# Patient Record
Sex: FEMALE | Race: OTHER | Employment: OTHER | URBAN - METROPOLITAN AREA
[De-identification: names, ages, dates, MRNs, and addresses within clinical notes are randomized per-mention and may not be internally consistent; named-entity substitution may affect disease eponyms.]

---

## 2019-03-28 ENCOUNTER — HOSPITAL ENCOUNTER (EMERGENCY)
Age: 69
Discharge: HOME | End: 2019-03-28
Payer: MEDICARE

## 2019-03-28 VITALS
HEIGHT: 59 IN | WEIGHT: 259.26 LBS | HEART RATE: 78 BPM | BODY MASS INDEX: 52.27 KG/M2 | TEMPERATURE: 97.2 F | DIASTOLIC BLOOD PRESSURE: 77 MMHG | OXYGEN SATURATION: 100 % | RESPIRATION RATE: 16 BRPM | SYSTOLIC BLOOD PRESSURE: 155 MMHG

## 2019-03-28 DIAGNOSIS — M79.605 LEFT LEG PAIN: Primary | ICD-10-CM

## 2019-03-28 PROCEDURE — 99283 EMERGENCY DEPT VISIT LOW MDM: CPT

## 2019-03-28 PROCEDURE — 93971 EXTREMITY STUDY: CPT

## 2019-03-28 RX ORDER — NAPROXEN 500 MG/1
500 TABLET ORAL 2 TIMES DAILY PRN
Qty: 20 TABLET | Refills: 0 | Status: SHIPPED | OUTPATIENT
Start: 2019-03-28

## 2019-03-28 ASSESSMENT — PAIN DESCRIPTION - FREQUENCY
FREQUENCY: CONTINUOUS
FREQUENCY: CONTINUOUS

## 2019-03-28 ASSESSMENT — PAIN DESCRIPTION - ONSET
ONSET: ON-GOING
ONSET: ON-GOING

## 2019-03-28 ASSESSMENT — PAIN DESCRIPTION - ORIENTATION
ORIENTATION: LEFT
ORIENTATION: LEFT

## 2019-03-28 ASSESSMENT — PAIN SCALES - GENERAL
PAINLEVEL_OUTOF10: 5
PAINLEVEL_OUTOF10: 6

## 2019-03-28 ASSESSMENT — PAIN DESCRIPTION - PROGRESSION
CLINICAL_PROGRESSION: GRADUALLY IMPROVING
CLINICAL_PROGRESSION: GRADUALLY WORSENING

## 2019-03-28 ASSESSMENT — PAIN DESCRIPTION - PAIN TYPE
TYPE: ACUTE PAIN
TYPE: ACUTE PAIN

## 2019-03-28 ASSESSMENT — PAIN DESCRIPTION - DESCRIPTORS
DESCRIPTORS: SHARP
DESCRIPTORS: SHARP

## 2019-03-28 ASSESSMENT — PAIN DESCRIPTION - LOCATION
LOCATION: LEG
LOCATION: LEG

## 2019-04-22 ENCOUNTER — HOSPITAL ENCOUNTER (OUTPATIENT)
Dept: MAMMOGRAPHY | Age: 69
Discharge: HOME | End: 2019-04-27
Payer: MEDICARE

## 2019-04-22 DIAGNOSIS — Z12.31 VISIT FOR SCREENING MAMMOGRAM: ICD-10-CM

## 2019-04-22 PROCEDURE — 77067 SCR MAMMO BI INCL CAD: CPT

## 2019-06-17 ENCOUNTER — OFFICE VISIT (OUTPATIENT)
Dept: SURGERY | Age: 69
End: 2019-06-17
Payer: MEDICARE

## 2019-06-17 VITALS
BODY MASS INDEX: 53.02 KG/M2 | SYSTOLIC BLOOD PRESSURE: 140 MMHG | TEMPERATURE: 97.2 F | DIASTOLIC BLOOD PRESSURE: 92 MMHG | WEIGHT: 263 LBS | HEIGHT: 59 IN

## 2019-06-17 DIAGNOSIS — R10.30 LOWER ABDOMINAL PAIN: Primary | ICD-10-CM

## 2019-06-17 PROCEDURE — G8400 PT W/DXA NO RESULTS DOC: HCPCS | Performed by: SURGERY

## 2019-06-17 PROCEDURE — 1036F TOBACCO NON-USER: CPT | Performed by: SURGERY

## 2019-06-17 PROCEDURE — G8427 DOCREV CUR MEDS BY ELIG CLIN: HCPCS | Performed by: SURGERY

## 2019-06-17 PROCEDURE — 1123F ACP DISCUSS/DSCN MKR DOCD: CPT | Performed by: SURGERY

## 2019-06-17 PROCEDURE — 99203 OFFICE O/P NEW LOW 30 MIN: CPT | Performed by: SURGERY

## 2019-06-17 PROCEDURE — 3017F COLORECTAL CA SCREEN DOC REV: CPT | Performed by: SURGERY

## 2019-06-17 PROCEDURE — G8417 CALC BMI ABV UP PARAM F/U: HCPCS | Performed by: SURGERY

## 2019-06-17 PROCEDURE — 1090F PRES/ABSN URINE INCON ASSESS: CPT | Performed by: SURGERY

## 2019-06-17 PROCEDURE — 4040F PNEUMOC VAC/ADMIN/RCVD: CPT | Performed by: SURGERY

## 2019-06-17 RX ORDER — OLMESARTAN MEDOXOMIL 40 MG/1
20 TABLET ORAL DAILY
COMMUNITY

## 2019-06-17 RX ORDER — CARVEDILOL 6.25 MG/1
25 TABLET ORAL 2 TIMES DAILY WITH MEALS
COMMUNITY

## 2019-06-17 RX ORDER — METFORMIN HYDROCHLORIDE 750 MG/1
750 TABLET, EXTENDED RELEASE ORAL
COMMUNITY

## 2019-06-17 RX ORDER — ACETAMINOPHEN 160 MG
TABLET,DISINTEGRATING ORAL
COMMUNITY

## 2019-06-17 NOTE — LETTER
[employer] [facility] [address]  [address] [place] [address] Road  Phone: [phone]  Fax: [phone]    Jay Isbell MD        June 20, 2019     Josy Del Rio, [address] [place] [id number]    Patient: Bakari Schwarz  MR Number: [id number]  YOB: 1950  Date of Visit: 6/17/2019    Dear Dr. Josy Del Rio:    Thank you for the request for consultation for Bakari Schwarz to me for the evaluation of an incisional hernia. Below are the relevant portions of my assessment and plan of care. Patient with reducible hernia infraumbilical incision secondary to previous appendectomy in 2017. Plan for CT of abdomen to better evaluate hernia and fascial defect. Patient is in agreement with this plan. We also discussed the importance of weight loss to minimize her risk of recurrence. I will see her back in the office after this study. If you have questions, please do not hesitate to call me. I look forward to following Cici Yi along with you.     Sincerely,        Jay Isbell MD

## 2019-06-17 NOTE — PROGRESS NOTES
Drug use: Not on file    Sexual activity: Not on file   Lifestyle    Physical activity:     Days per week: Not on file     Minutes per session: Not on file    Stress: Not on file   Relationships    Social connections:     Talks on phone: Not on file     Gets together: Not on file     Attends Synagogue service: Not on file     Active member of club or organization: Not on file     Attends meetings of clubs or organizations: Not on file     Relationship status: Not on file    Intimate partner violence:     Fear of current or ex partner: Not on file     Emotionally abused: Not on file     Physically abused: Not on file     Forced sexual activity: Not on file   Other Topics Concern    Not on file   Social History Narrative    Not on file       Allergy:   Allergies   Allergen Reactions    Statins Itching and Rash    Penicillins Swelling and Rash       PHYSICAL EXAM:  VITALS:  BP (!) 140/92 (Site: Left Lower Arm, Position: Sitting, Cuff Size: Medium Adult)   Temp 97.2 °F (36.2 °C) (Oral)   Ht 4' 11\" (1.499 m)   Wt 263 lb (119.3 kg)   BMI 53.12 kg/m²     CONSTITUTIONAL:  alert, no apparent distress and morbidly obese  EYES:  sclera clear  ENT:  normocepalic, without obvious abnormality  NECK:  supple, symmetrical, trachea midline and no carotid bruits  LUNGS:  clear to auscultation  CARDIOVASCULAR:  regular rate and rhythm and no murmur noted  ABDOMEN:  Infraumbilical scar with reducible hernia, no overlying skin color changes, trocar site of left abdomen with mind indentation without hernia noted, normal bowel sounds, soft, non-distended, non-tender, voluntary guarding absent, no masses palpated   MUSCULOSKELETAL:  0+ pitting edema lower extremities  NEUROLOGIC:  Mental Status Exam:  Level of Alertness:   awake  Orientation:   person, place, time  SKIN:  no bruising or bleeding    IMPRESSION/RECOMMENDATIONS:    Patient with reducible hernia infraumbilical incision secondary to previous appendectomy in 2017. Plan for CT of abdomen to better evaluate hernia and fascial defect. Patient is in agreement with this plan. We also discussed the importance of weight loss to minimize her risk of recurrence. Jose David Jameson     I have seen, examined, and reviewed the patients chart. I agree with the residents assessment and have made appropriate changes.     Nickie Thakkar

## 2019-06-24 ENCOUNTER — HOSPITAL ENCOUNTER (OUTPATIENT)
Dept: CT IMAGING | Age: 69
Discharge: HOME | End: 2019-06-24
Payer: MEDICARE

## 2019-06-24 DIAGNOSIS — R10.30 LOWER ABDOMINAL PAIN: ICD-10-CM

## 2019-06-24 PROCEDURE — 74176 CT ABD & PELVIS W/O CONTRAST: CPT

## 2019-06-27 ENCOUNTER — TELEPHONE (OUTPATIENT)
Dept: SURGERY | Age: 69
End: 2019-06-27

## 2019-06-27 NOTE — TELEPHONE ENCOUNTER
JONATHAN  for a call back to schedule follow up visit with  to discuss 6/24/19 CT results and next step.

## 2019-07-01 ENCOUNTER — OFFICE VISIT (OUTPATIENT)
Dept: SURGERY | Age: 69
End: 2019-07-01
Payer: MEDICARE

## 2019-07-01 VITALS
DIASTOLIC BLOOD PRESSURE: 82 MMHG | WEIGHT: 264 LBS | BODY MASS INDEX: 53.22 KG/M2 | SYSTOLIC BLOOD PRESSURE: 180 MMHG | TEMPERATURE: 97.2 F | HEIGHT: 59 IN

## 2019-07-01 DIAGNOSIS — K43.9 VENTRAL HERNIA WITHOUT OBSTRUCTION OR GANGRENE: Primary | ICD-10-CM

## 2019-07-01 PROCEDURE — 1123F ACP DISCUSS/DSCN MKR DOCD: CPT | Performed by: SURGERY

## 2019-07-01 PROCEDURE — 4040F PNEUMOC VAC/ADMIN/RCVD: CPT | Performed by: SURGERY

## 2019-07-01 PROCEDURE — G8400 PT W/DXA NO RESULTS DOC: HCPCS | Performed by: SURGERY

## 2019-07-01 PROCEDURE — 1036F TOBACCO NON-USER: CPT | Performed by: SURGERY

## 2019-07-01 PROCEDURE — 1090F PRES/ABSN URINE INCON ASSESS: CPT | Performed by: SURGERY

## 2019-07-01 PROCEDURE — G8417 CALC BMI ABV UP PARAM F/U: HCPCS | Performed by: SURGERY

## 2019-07-01 PROCEDURE — G8427 DOCREV CUR MEDS BY ELIG CLIN: HCPCS | Performed by: SURGERY

## 2019-07-01 PROCEDURE — 99213 OFFICE O/P EST LOW 20 MIN: CPT | Performed by: SURGERY

## 2019-07-01 PROCEDURE — 3017F COLORECTAL CA SCREEN DOC REV: CPT | Performed by: SURGERY

## 2020-10-19 ENCOUNTER — HOSPITAL ENCOUNTER (OUTPATIENT)
Dept: MAMMOGRAPHY | Age: 70
Discharge: HOME | End: 2020-10-24
Payer: MEDICARE

## 2020-10-19 PROCEDURE — 77067 SCR MAMMO BI INCL CAD: CPT

## 2020-11-02 ENCOUNTER — APPOINTMENT (OUTPATIENT)
Dept: GENERAL RADIOLOGY | Age: 70
End: 2020-11-02
Payer: MEDICARE

## 2020-11-02 ENCOUNTER — HOSPITAL ENCOUNTER (EMERGENCY)
Age: 70
Discharge: HOME | End: 2020-11-02
Payer: MEDICARE

## 2020-11-02 VITALS
TEMPERATURE: 98.6 F | WEIGHT: 270 LBS | DIASTOLIC BLOOD PRESSURE: 87 MMHG | BODY MASS INDEX: 54.43 KG/M2 | RESPIRATION RATE: 24 BRPM | OXYGEN SATURATION: 95 % | HEIGHT: 59 IN | HEART RATE: 71 BPM | SYSTOLIC BLOOD PRESSURE: 203 MMHG

## 2020-11-02 PROCEDURE — 99283 EMERGENCY DEPT VISIT LOW MDM: CPT

## 2020-11-02 PROCEDURE — 73560 X-RAY EXAM OF KNEE 1 OR 2: CPT

## 2020-11-02 PROCEDURE — 6370000000 HC RX 637 (ALT 250 FOR IP): Performed by: PHYSICIAN ASSISTANT

## 2020-11-02 RX ORDER — LIDOCAINE 50 MG/G
1 PATCH TOPICAL DAILY
Qty: 30 PATCH | Refills: 0 | Status: SHIPPED | OUTPATIENT
Start: 2020-11-02

## 2020-11-02 RX ADMIN — APIXABAN 10 MG: 5 TABLET, FILM COATED ORAL at 19:27

## 2020-11-02 ASSESSMENT — PAIN SCALES - GENERAL: PAINLEVEL_OUTOF10: 6

## 2020-11-02 ASSESSMENT — PAIN DESCRIPTION - PAIN TYPE: TYPE: ACUTE PAIN

## 2020-11-02 NOTE — ED PROVIDER NOTES
[facility]        Pt Name: Pelon Cardenas  MRN: [id number]  [place] 1950  Date of evaluation: 2020  Provider: Geo Lind PA-C  PCP: Jess Omalley MD    ROPOA. I have evaluated this patient. My supervising physician was available for consultation. CHIEF COMPLAINT       Chief Complaint   Patient presents with    Knee Pain     Pain behind left knee for 2 days. hx of DVT, denies injury       HISTORY OF PRESENT ILLNESS   (Location, Timing/Onset, Context/Setting, Quality, Duration, Modifying Factors, Severity, Associated Signs and Symptoms)  Note limiting factors. Pelon Cardenas is a 78 y.o. female that presents to the emergency department with a chief complaint of some left knee pain behind her knee for the past 2 days without injury or trauma. She states 3 years ago she had a DVT after an appendectomy and is no longer on blood thinners. She only takes aspirin at this time. She denies chest pain, shortness of breath, nausea, vomiting, fevers, numbness, color change. She rates the pain a 6 out of 10. She still been able to ambulate with her cane. Denies any other symptoms. Nursing Notes were all reviewed and agreed with or any disagreements were addressed in the HPI. REVIEW OF SYSTEMS    (2-9 systems for level 4, 10 or more for level 5)     Review of Systems    Positives and Pertinent negatives as per HPI. Except as noted above in the ROS, all other systems were reviewed and negative.        PAST MEDICAL HISTORY     Past Medical History:   Diagnosis Date    Hypertension     Type 2 diabetes mellitus without complication ([facility] [address]          SURGICAL HISTORY     Past Surgical History:   Procedure Laterality Date    APPENDECTOMY  2017     SECTION      [id number]    GASTRIC BYPASS SURGERY  2009    HYSTERECTOMY, TOTAL ABDOMINAL  1992         CURRENTMEDICATIONS       Previous Medications    ASPIRIN 81 MG TABLET    Take 81 mg by mouth daily    CARVEDILOL (COREG) 6.25 MG TABLET    Take 25 mg by mouth 2 times daily (with meals)     CHOLECALCIFEROL (VITAMIN D3) 2000 UNITS CAPS    Take by mouth    METFORMIN (GLUCOPHAGE-XR) 750 MG EXTENDED RELEASE TABLET    Take 750 mg by mouth daily (with breakfast)    NAPROXEN (NAPROSYN) 500 MG TABLET    Take 1 tablet by mouth 2 times daily as needed for Pain    OLMESARTAN (BENICAR) 40 MG TABLET    Take 20 mg by mouth daily          ALLERGIES     Statins and Penicillins    FAMILYHISTORY     History reviewed. No pertinent family history. SOCIAL HISTORY       Social History     Tobacco Use    Smoking status: Never Smoker    Smokeless tobacco: Never Used   Substance Use Topics    Alcohol use: Not Currently    Drug use: Not Currently       SCREENINGS             PHYSICAL EXAM    (up to 7 for level 4, 8 or more for level 5)     ED Triage Vitals [11/02/20 1556]   BP Temp Temp src Pulse Resp SpO2 Height Weight   (!) 230/97 98.6 °F (37 °C) -- 71 24 95 % 4' 11\" (1.499 m) 270 lb (122.5 kg)       Physical Exam  Vitals signs and nursing note reviewed. Constitutional:       Appearance: She is well-developed. She is not diaphoretic. HENT:      Head: Atraumatic. Nose: Nose normal.   Eyes:      General:         Right eye: No discharge. Left eye: No discharge. Neck:      Musculoskeletal: Normal range of motion. Cardiovascular:      Rate and Rhythm: Normal rate and regular rhythm. Pulses: Normal pulses. Heart sounds: Normal heart sounds. No murmur. No gallop. Comments: 2+ posterior tibial pulse in the left foot  Pulmonary:      Effort: Pulmonary effort is normal.      Breath sounds: Normal breath sounds. No stridor. No wheezing, rhonchi or rales. Musculoskeletal:         General: Tenderness present. No deformity. Comments: Generalized tenderness around the left knee without joint warmth, erythema, rash or deformity.   Full range of motion of left hip and left ankle. Mild decreased range of motion of the left knee secondary to pain. Patient witnessed walking easily with her cane. Skin:     General: Skin is warm and dry. Findings: No erythema or rash. Neurological:      Mental Status: She is alert and oriented to person, place, and time. Cranial Nerves: No cranial nerve deficit. Psychiatric:         Behavior: Behavior normal.         DIAGNOSTIC RESULTS   LABS:    Labs Reviewed - No data to display    All other labs were within normal range or not returned as of this dictation. EKG: All EKG's are interpreted by the Emergency Department Physician in the absence of a cardiologist.  Please see their note for interpretation of EKG. RADIOLOGY:   Non-plain film images such as CT, Ultrasound and MRI are read by the radiologist. Plain radiographic images are visualized and preliminarily interpreted by the ED Provider with the below findings:        Interpretation per the Radiologist below, if available at the time of this note:    XR KNEE LEFT (1-2 VIEWS)   Final Result   No acute osseous abnormality of the left knee. Severe tricompartment   osteoarthritic changes. VL Extremity Venous Left    (Results Pending)     Xr Knee Left (1-2 Views)    Result Date: 11/2/2020  EXAMINATION: 2 XRAY VIEWS OF THE LEFT KNEE 11/2/2020 6:08 pm COMPARISON: None. HISTORY: ORDERING SYSTEM PROVIDED HISTORY: pain TECHNOLOGIST PROVIDED HISTORY: Reason for exam:->pain Reason for Exam: left knee pain Acuity: Acute Type of Exam: Initial FINDINGS: No acute fracture or dislocation. Severe tricompartment osteoarthritic changes of the knee with moderately severe narrowing of the medial compartment of the tibiofemoral joint and prominent tricompartment osteoarthritic spurring. Minimal suprapatellar joint effusion. No acute osseous abnormality of the left knee. Severe tricompartment osteoarthritic changes.            PROCEDURES   Unless otherwise noted below, none Procedures    CRITICAL CARE TIME   N/A    CONSULTS:  None      EMERGENCY DEPARTMENT COURSE and DIFFERENTIAL DIAGNOSIS/MDM:   Vitals:    Vitals:    11/02/20 1556   BP: (!) 230/97   Pulse: 71   Resp: 24   Temp: 98.6 °F (37 °C)   SpO2: 95%   Weight: 270 lb (122.5 kg)   Height: 4' 11\" (1.499 m)       Patient was given the following medications:  Medications   apixaban (ELIQUIS) tablet 10 mg (has no administration in time range)     Followed by   apixaban (ELIQUIS) tablet 10 mg (has no administration in time range)           That presents to the emergency department with a chief complaint of some nontraumatic left knee pain. Has history of DVT. She has been ambulating here with her walker normally. X-ray imaging reveals severe tricompartmental osteoarthritic changes. The pain could just be from osteoarthritis but patient also has history of DVT. Unable to get ultrasound as the patient came after 3 PM.  She will be discharged with Eliquis starter pack along with a stat ultrasound of her left leg. Low suspicion for acute fracture, arterial occlusion, septic arthritis, cellulitis or acute bony normality. Her blood pressure is elevated here but she is asymptomatic from this. She takes blood pressure medicine at home. She can follow-up with her family physician. Also referred to orthopedics due to the osteoarthritic changes. Return here for any worsening of symptoms or problems at home. FINAL IMPRESSION      1. Acute pain of left knee    2. History of DVT (deep vein thrombosis)    3.  Osteoarthritis of left knee, unspecified osteoarthritis type          DISPOSITION/PLAN   DISPOSITION Decision To Discharge 11/02/2020 06:39:46 PM      PATIENT REFERREDTO:  Paty Vidal MD  [address] [place] [address], [address]  [facility]  [phone]    Schedule an appointment as soon as possible for a visit   5-7 days    Liya Horvath MD  [address] [address]  [phone]    Schedule an appointment as soon as possible for a visit in 3 days  For re-check    [facility] Emergency Department  [address] [facility]  [phone]    As needed      DISCHARGE MEDICATIONS:  New Prescriptions    LIDOCAINE (LIDODERM) 5 %    Place 1 patch onto the skin daily 12 hours on, 12 hours off.        DISCONTINUED MEDICATIONS:  Discontinued Medications    No medications on file              (Please note that portions of this note were completed with a voice recognition program.  Efforts were made to edit the dictations but occasionally words are mis-transcribed.)    Jazz Cummins PA-C (electronically signed)           Jazz Cummins PA-C  11/02/20 1939

## 2020-11-03 NOTE — ED NOTES
Reviewed written discharge instructions with patient, instructed patient to call [address] in the morning to schedule a vascular doppler. Patient provided the eliquis dvt starter pack and instructions provided to patient. Patient denied questions and verbalized understanding. Patient ambulated out of ED using her cane without difficulty.      Paulina Sahu RN  11/02/20 6400

## 2020-11-03 NOTE — ED NOTES
Discussed patient's blood pressure with Malgorzata Altamirano. OK to discharge patient. Patient stated she takes her blood pressure medicine daily at 0600 and that she has an appointment to see her pcp on Wednesday.      Matteo Nazario RN  11/02/20 1913

## 2020-11-04 ENCOUNTER — TELEPHONE (OUTPATIENT)
Dept: PHARMACY | Age: 70
End: 2020-11-04

## 2020-11-04 ENCOUNTER — HOSPITAL ENCOUNTER (OUTPATIENT)
Dept: VASCULAR LAB | Age: 70
Discharge: HOME | End: 2020-11-04
Payer: MEDICARE

## 2020-11-04 PROCEDURE — 93971 EXTREMITY STUDY: CPT

## 2020-11-04 NOTE — TELEPHONE ENCOUNTER
Samira Hwang called from vascular. Patient was in the ER on 11/2 and was given Eliquis, vascular results are negative for DVT. Spoke with patient and took back medication that was given to them. Disposed of at [address]. Pt has PCP appt today. Any questions or concerns return call to clinic     [facility]. [facility]  [phone]

## 2020-11-04 NOTE — TELEPHONE ENCOUNTER
Abdulkadir Madera, from vascular lab, called to notify us he was sending patient up for a pharmacy consult. States patient was (-)negative for DVT in left leg.

## 2021-08-09 ENCOUNTER — OFFICE VISIT (OUTPATIENT)
Dept: ORTHOPEDIC SURGERY | Age: 71
End: 2021-08-09
Payer: MEDICARE

## 2021-08-09 VITALS — WEIGHT: 274 LBS | HEIGHT: 59 IN | BODY MASS INDEX: 55.24 KG/M2

## 2021-08-09 DIAGNOSIS — E66.01 CLASS 3 SEVERE OBESITY DUE TO EXCESS CALORIES WITH BODY MASS INDEX (BMI) OF 50.0 TO 59.9 IN ADULT, UNSPECIFIED WHETHER SERIOUS COMORBIDITY PRESENT (HCC): ICD-10-CM

## 2021-08-09 DIAGNOSIS — M17.0 ARTHRITIS OF BOTH KNEES: Primary | ICD-10-CM

## 2021-08-09 PROCEDURE — G8417 CALC BMI ABV UP PARAM F/U: HCPCS | Performed by: ORTHOPAEDIC SURGERY

## 2021-08-09 PROCEDURE — 4040F PNEUMOC VAC/ADMIN/RCVD: CPT | Performed by: ORTHOPAEDIC SURGERY

## 2021-08-09 PROCEDURE — 99203 OFFICE O/P NEW LOW 30 MIN: CPT | Performed by: ORTHOPAEDIC SURGERY

## 2021-08-09 PROCEDURE — 1123F ACP DISCUSS/DSCN MKR DOCD: CPT | Performed by: ORTHOPAEDIC SURGERY

## 2021-08-09 PROCEDURE — G8400 PT W/DXA NO RESULTS DOC: HCPCS | Performed by: ORTHOPAEDIC SURGERY

## 2021-08-09 PROCEDURE — 1090F PRES/ABSN URINE INCON ASSESS: CPT | Performed by: ORTHOPAEDIC SURGERY

## 2021-08-09 PROCEDURE — G8427 DOCREV CUR MEDS BY ELIG CLIN: HCPCS | Performed by: ORTHOPAEDIC SURGERY

## 2021-08-09 PROCEDURE — 1036F TOBACCO NON-USER: CPT | Performed by: ORTHOPAEDIC SURGERY

## 2021-08-09 PROCEDURE — 3017F COLORECTAL CA SCREEN DOC REV: CPT | Performed by: ORTHOPAEDIC SURGERY

## 2021-08-09 RX ORDER — BLOOD SUGAR DIAGNOSTIC
STRIP MISCELLANEOUS
COMMUNITY
Start: 2021-08-05

## 2021-08-09 NOTE — PROGRESS NOTES
Sima Kiser MD  [address]. [facility], [address]  [address]  [address] [facility], [address]    History of Present Illness:  Chief Complaint   Patient presents with    Knee Pain     New, LT knee pain for years, NKI. pain is constant      Jazmin Hawk is a 73 y.o. female here for evaluation of left knee pain. Pain assessment has been completed and is documented below. Patient was referred here for orthopaedic evaluation by Eloisa Levy MD.  She complains of chronic left knee, that is focused on the medial aspect, that is constant. She feels that the right knee causes her some pain but not as much as the left. She proceeded with bilateral knee arthroscopic surgery in 2005. She denies that she proceeded with any injections in her knees. She was supposed to proceed with replacement surgery approximately 10 years ago and was scared to proceed. She is curious about treatment other than surgery. She has used OTC Voltaren gel and feels it offers relief for her knee pain. Her diabetes is currently controlled and managed by Dr. Mumtaz Ferrer. She has a history of blood clots. After her appendectomy, she states that they had to leave the site open and pack it. She has proceeded with weight loss surgery in 2011. Since then she has gained all the weight she lost back. She denies any issues with her heart.          Pain Assessment  Location of Pain: Knee  Location Modifiers: Left  Severity of Pain: 10  Quality of Pain: Aching, Dull  Duration of Pain: Persistent  Frequency of Pain: Constant  Aggravating Factors: Walking, Standing, Squatting, Stairs  Limiting Behavior: Some  Relieving Factors: Rest  Result of Injury: No  Work-Related Injury: No  Are there other pain locations you wish to document?: No    Medication Review:  Current Outpatient Medications   Medication Sig Dispense Refill    ACCU-CHEK GUIDE strip       lidocaine (LIDODERM) 5 % Place 1 patch onto the skin daily 12 hours on, 12 hours off. 30 patch 0    carvedilol (COREG) 6.25 MG tablet Take 25 mg by mouth 2 times daily (with meals)       Cholecalciferol (VITAMIN D3) 2000 units CAPS Take by mouth      aspirin 81 MG tablet Take 81 mg by mouth daily      olmesartan (BENICAR) 40 MG tablet Take 20 mg by mouth daily       metFORMIN (GLUCOPHAGE-XR) 750 MG extended release tablet Take 750 mg by mouth daily (with breakfast)      naproxen (NAPROSYN) 500 MG tablet Take 1 tablet by mouth 2 times daily as needed for Pain 20 tablet 0     No current facility-administered medications for this visit. Review of Systems:  Relevant review of systems reviewed and can be found in the Media section of patient's chart. Medical History:  Past Medical History:   Diagnosis Date    Hypertension     Type 2 diabetes mellitus without complication [facility])         Past Surgical History:   Procedure Laterality Date    APPENDECTOMY  2017     SECTION      [id number]    GASTRIC BYPASS SURGERY  2009    HYSTERECTOMY, TOTAL ABDOMINAL  1992      Allergies, social and family histories, and medications were reviewed and updated as appropriate. General Exam:  Vital Signs:  Ht 4' 11\" (1.499 m)   Wt 274 lb (124.3 kg)   BMI 55.34 kg/m²    Constitutional: Patient is adequately groomed with severe morbid obesity. Mental Status: The patient is oriented to time, place and person. The patient's mood and affect are appropriate. Neurological: The patient has good coordination. There is no focal weakness or sensory deficit. Bilateral Knee Exam:   Inspection & Skin:  Knee shows vaurs alignment bilaterally. Muscle bulk and tone difficult to assess due to adiposity. Palpation:     Bilateral moderate tenderness on palpation along the medial joint line.  Bilateral moderate tenderness on palpation along the lateral joint line.  Extensor mechanism intact on palpation.     Palpable retropatellar crepitation in each knee on range of motion    Range of Motion:     Right:   o Extension: 5°  o Flexion: 100 °   Left:  o Extension: 5°  o Flexion: 100°  Strength:     Right:  o Quad 5-/5. Hamstrings 5-/5.   o Hip and ankle motor function are grossly intact.  Left:  o Quad 5-/5. Hamstrings 5-/5.   o Hip and ankle motor function are grossly intact. Special Tests:    No gross ligamentous instability but testing is difficult due to body habitus.  No posterior sag   Patellar grind + bilaterally   Beverly's sign negative   Capillary refill is brisk, sensation in intact    Gait: Decreased stride length and weight shift, favoring her right side. Adaptive device: single prong cane. Radiology:     X-rays obtained today and reviewed in office:  Views 4: left knee with comparison AP, flexion PA, and skyline views. Impression: No evidence for acute fracture, subluxation, or dislocation. No lytic or blastic lesions in the metaphyseal regions. Severe bilateral knee arthritis (Kellgren-Wyatt grade 4) with varus alignment. Office Orders/Procedures:  Orders Placed This Encounter   Procedures    XR KNEE BILATERAL STANDING     Standing Status:   Future     Number of Occurrences:   1     Standing Expiration Date:   9/9/2021    XR KNEE LEFT (3 VIEWS)     Standing Status:   Future     Number of Occurrences:   1     Standing Expiration Date:   9/9/2021    Nataliya Crowder MD, Bariatric Surgery, [facility]     Referral Priority:   Routine     Referral Type:   Eval and Treat     Referral Reason:   Specialty Services Required     Requested Specialty:   Bariatrics     Number of Visits Requested:   1       Impression:   Diagnosis Orders   1.  Left knee pain, unspecified chronicity  XR KNEE BILATERAL STANDING    XR KNEE LEFT (3 VIEWS)   2. Class 3 severe obesity due to excess calories with body mass index (BMI) of 50.0 to 59.9 in adult, unspecified whether serious comorbidity present [facility] Haydee Fuller MD, Bariatric Surgery, [facility]        Treatment Plan:  I have discussed treatment options with the patient. After reviewing her x-rays, it shows both knees with significant arthritis, with the left knee being focused on the medial aspect. There is the options to proceed with injections in bilateral knees, however, they would only offer short term relief. Even the option to proceed with the Coolief procedure could only offer her  6 months of relief. I informed her that at this point in time, the biggest risk prior to surgery is her weight. I informed her that per her BMI, she is considered to be high risk. Falling is also a big risk, as it could lead to developing an infection, which could cause her to loose her leg. Most patients with deep infections, have to proceed with multiple surgeries and don't get the results that they would like. Currently, insurance companies are also denying surgeries due to patients' BMI's. I would recommend that she loose about 40 lbs prior to proceeding with surgery. This will allow her to have a better recovery after surgery. Since she has previously proceeded with weight loss surgery, I would recommend she proceed with further treatment for her weight loss with Dr. Marisol Cedeno. I have reviewed patient's pertinent medical history, relevant laboratory and imaging studies, and past surgical history. Patient's medications have been reviewed and were discussed during the visit. Patient was advised to keep future appointments with their respective specialty care team(s). Patient had the opportunity to ask questions, all of which were answered to the best of my ability and with patient satisfaction. Patient understands and is agreeable with the care plan following today's visit. Patient is to schedule an appointment for any new or worsening symptoms.       By signing my name below, Rinaldo Mohs, attest that this documentation has been prepared under the direction and in the presence of Raymond Sears MD.   Electronically Signed: Damian Jack, 8/9/21, 8:11 AM EDT. Ernesto Hernandes MD, personally performed the services described in this documentation. All medical record entries made by the scribe were at my direction and in my presence. I have reviewed the chart and discharge instructions (if applicable) and agree that the record reflects my personal performance and is accurate and complete. Raymond Sears MD       Some documentation was done using voice recognition dragon software. Every effort was made to ensure accuracy; however, inadvertent unintentional computerized transcription errors may be present.

## 2021-11-01 PROBLEM — E66.01 CLASS 3 SEVERE OBESITY DUE TO EXCESS CALORIES WITH BODY MASS INDEX (BMI) OF 50.0 TO 59.9 IN ADULT (HCC): Status: ACTIVE | Noted: 2021-11-01

## 2021-11-01 PROBLEM — M17.0 ARTHRITIS OF BOTH KNEES: Status: ACTIVE | Noted: 2021-11-01

## 2021-11-22 ENCOUNTER — OFFICE VISIT (OUTPATIENT)
Dept: ORTHOPEDIC SURGERY | Age: 71
End: 2021-11-22
Payer: MEDICARE

## 2021-11-22 VITALS — BODY MASS INDEX: 54.92 KG/M2 | WEIGHT: 272.4 LBS | HEIGHT: 59 IN | RESPIRATION RATE: 16 BRPM

## 2021-11-22 DIAGNOSIS — M25.512 ACUTE PAIN OF LEFT SHOULDER: Primary | ICD-10-CM

## 2021-11-22 PROCEDURE — 99213 OFFICE O/P EST LOW 20 MIN: CPT | Performed by: ORTHOPAEDIC SURGERY

## 2021-11-22 PROCEDURE — 1123F ACP DISCUSS/DSCN MKR DOCD: CPT | Performed by: ORTHOPAEDIC SURGERY

## 2021-11-22 PROCEDURE — 4040F PNEUMOC VAC/ADMIN/RCVD: CPT | Performed by: ORTHOPAEDIC SURGERY

## 2021-11-22 PROCEDURE — G8400 PT W/DXA NO RESULTS DOC: HCPCS | Performed by: ORTHOPAEDIC SURGERY

## 2021-11-22 PROCEDURE — G8484 FLU IMMUNIZE NO ADMIN: HCPCS | Performed by: ORTHOPAEDIC SURGERY

## 2021-11-22 PROCEDURE — 3017F COLORECTAL CA SCREEN DOC REV: CPT | Performed by: ORTHOPAEDIC SURGERY

## 2021-11-22 PROCEDURE — G8417 CALC BMI ABV UP PARAM F/U: HCPCS | Performed by: ORTHOPAEDIC SURGERY

## 2021-11-22 PROCEDURE — 1090F PRES/ABSN URINE INCON ASSESS: CPT | Performed by: ORTHOPAEDIC SURGERY

## 2021-11-22 PROCEDURE — 1036F TOBACCO NON-USER: CPT | Performed by: ORTHOPAEDIC SURGERY

## 2021-11-22 PROCEDURE — G8427 DOCREV CUR MEDS BY ELIG CLIN: HCPCS | Performed by: ORTHOPAEDIC SURGERY

## 2021-11-22 NOTE — PROGRESS NOTES
CHIEF COMPLAINT: Left shoulder pain    DATE OF INJURY: 10/15/21    History:    Rosemary Guzman is a 73 y.o. right handed female self-referred for evaluation and treatment of Left shoulder pain. This is evaluated as a personal injury. The pain began 5 weeks ago. Pain is rated as a 5-6/10. There was an injury. She tripped and fell and landed on her left side. Pain is located globally about her left shoulder. She does have a history of left shoulder rotator cuff repair in . She states this does not feel the same. Pain is worse when laying on her side. The patient has not had PT. The patient has not had an injection. She has tried Voltaren gel with relief. She has not used ice. Outside reports reviewed:  none. Past Medical History:   Diagnosis Date    Hypertension     Type 2 diabetes mellitus without complication [facility])        Past Surgical History:   Procedure Laterality Date    APPENDECTOMY  2017     SECTION      [id number]    GASTRIC BYPASS SURGERY  2009    HYSTERECTOMY, TOTAL ABDOMINAL  1992    SHOULDER ARTHROSCOPY Left        Current Outpatient Medications on File Prior to Visit   Medication Sig Dispense Refill    ACCU-CHEK GUIDE strip       lidocaine (LIDODERM) 5 % Place 1 patch onto the skin daily 12 hours on, 12 hours off. 30 patch 0    carvedilol (COREG) 6.25 MG tablet Take 25 mg by mouth 2 times daily (with meals)       Cholecalciferol (VITAMIN D3) 2000 units CAPS Take by mouth      aspirin 81 MG tablet Take 81 mg by mouth daily      olmesartan (BENICAR) 40 MG tablet Take 20 mg by mouth daily       metFORMIN (GLUCOPHAGE-XR) 750 MG extended release tablet Take 750 mg by mouth daily (with breakfast)      naproxen (NAPROSYN) 500 MG tablet Take 1 tablet by mouth 2 times daily as needed for Pain 20 tablet 0     No current facility-administered medications on file prior to visit.        Allergies   Allergen Reactions    Statins Itching and Rash    Penicillins Swelling and Rash       Social History     Socioeconomic History    Marital status:      Spouse name: Not on file    Number of children: Not on file    Years of education: Not on file    Highest education level: Not on file   Occupational History    Not on file   Tobacco Use    Smoking status: Never Smoker    Smokeless tobacco: Never Used   Substance and Sexual Activity    Alcohol use: Not Currently    Drug use: Not Currently    Sexual activity: Not on file   Other Topics Concern    Not on file   Social History Narrative    Not on file     Social Determinants of Health     Financial Resource Strain:     Difficulty of Paying Living Expenses: Not on file   Food Insecurity:     Worried About Running Out of Food in the Last Year: Not on file    [place] of Food in the Last Year: Not on file   Transportation Needs:     Lack of Transportation (Medical): Not on file    Lack of Transportation (Non-Medical): Not on file   Physical Activity:     Days of Exercise per Week: Not on file    Minutes of Exercise per Session: Not on file   Stress:     Feeling of Stress : Not on file   Social Connections:     Frequency of Communication with Friends and Family: Not on file    Frequency of Social Gatherings with Friends and Family: Not on file    Attends Mosque Services: Not on file    Active Member of [address] [place] [address] [employer] or Organizations: Not on file    Attends Club or Organization Meetings: Not on file    Marital Status: Not on file   Intimate Partner Violence:     Fear of Current or Ex-Partner: Not on file    Emotionally Abused: Not on file    Physically Abused: Not on file    Sexually Abused: Not on file   Housing Stability:     Unable to Pay for Housing in the Last Year: Not on file    Number of [place] in the Last Year: Not on file    Unstable Housing in the Last Year: Not on file       No family history on file.     Review of Systems:   I have reviewed the clinically relevant past medical history, medications, allergies, family history, social history, and 13 point Review of Systems from the patient's recent history form & documented any details relevant to today's presenting complaints in the history above. The patient's self-reported past medical history, medications, allergies, family history, social history, and Review of Systems form from 11/22/21 have been scanned into the chart under the \"Media\" tab. Physical Examination:      Vital signs:  Resp 16   Ht 4' 11\" (1.499 m)   Wt 272 lb 6.4 oz (123.6 kg)   BMI 55.02 kg/m²     General:   alert, appears stated age, cooperative and no distress   Left Shoulder   Active ROM:   forward flexion 180, external rotation 80, internal rotation L4. Bilateral shoulders   Joint Tenderness:   globally   Neer:   positive   Infante:   negative   Strength:   5/5 Supraspinatus, External rotation    Bilateral shoulders   Drop-arm test:   negative   Belly-press test:   negative   Bear-hug test:   negative   Speed's test:   not tested   Bicipital groove tenderness:  negative   Chapman's test:   not tested   Cross-body adduction test:   negative    AC joint tenderness:   positive     There are no skin lesions, cellulitis, or extreme edema in the upper extremities. Sensation is grossly intact to light touch bilaterally upper extremity. The patient has warm and well-perfused Bilateral upper extremities with brisk capillary refill. Imaging   Left Shoulder X-Ray: 3 view x-rays of the shoulder including AP, scapular Y, and axillary    obtained and reviewed  AC Joint: narrowing moderate  Glenohumeral joint: no abnormalities noted  Elevation humeral head: absent        Assessment:      Left shoulder contusion  Morbid obesity  Diabetes  Hypertension      Plan:      Natural history and expected course discussed. Questions answered. Ice as needed. Continue Voltaren gel as needed. Discussed physical therapy. She will like to hold off at this time.   If symptoms do not continue to improve over the next 4-6 weeks, she will give us a call for PT referral.    Follow-up as needed. Jodie Garcia. Ria Mota MD  Orthopaedic Surgery and Sports Medicine     Disclaimer: This note was generated with use of a verbal recognition program and an attempt was made to check for errors. It is possible that there are still dictated errors within this office note. If so, please bring any significant errors to my attention for an addendum. All efforts were made to ensure that this office note is accurate.

## 2022-01-05 ENCOUNTER — HOSPITAL ENCOUNTER (OUTPATIENT)
Dept: VASCULAR LAB | Age: 72
Discharge: HOME | End: 2022-01-05
Payer: MEDICARE

## 2022-01-05 DIAGNOSIS — R22.41 KNEE MASS, RIGHT: ICD-10-CM

## 2022-01-05 PROCEDURE — 93971 EXTREMITY STUDY: CPT

## 2022-02-21 ENCOUNTER — HOSPITAL ENCOUNTER (OUTPATIENT)
Dept: VASCULAR LAB | Age: 72
Discharge: HOME | End: 2022-02-21
Payer: MEDICARE

## 2022-02-21 DIAGNOSIS — M79.662 PAIN OF LEFT LOWER LEG: ICD-10-CM

## 2022-02-21 PROCEDURE — 93971 EXTREMITY STUDY: CPT

## 2022-03-14 ENCOUNTER — HOSPITAL ENCOUNTER (OUTPATIENT)
Dept: NON INVASIVE DIAGNOSTICS | Age: 72
Discharge: HOME | End: 2022-03-14
Payer: MEDICARE

## 2022-03-14 DIAGNOSIS — R07.89 ATYPICAL CHEST PAIN: ICD-10-CM

## 2022-03-14 LAB
LV EF: 71 %
LVEF MODALITY: NORMAL

## 2022-03-14 PROCEDURE — 93017 CV STRESS TEST TRACING ONLY: CPT | Performed by: INTERNAL MEDICINE

## 2022-03-14 PROCEDURE — 78452 HT MUSCLE IMAGE SPECT MULT: CPT | Performed by: INTERNAL MEDICINE

## 2022-03-14 PROCEDURE — A9502 TC99M TETROFOSMIN: HCPCS | Performed by: INTERNAL MEDICINE

## 2022-03-14 PROCEDURE — 3430000000 HC RX DIAGNOSTIC RADIOPHARMACEUTICAL: Performed by: INTERNAL MEDICINE

## 2022-03-14 PROCEDURE — 6360000002 HC RX W HCPCS: Performed by: INTERNAL MEDICINE

## 2022-03-14 RX ORDER — AMINOPHYLLINE DIHYDRATE 25 MG/ML
100 INJECTION, SOLUTION INTRAVENOUS ONCE
Status: COMPLETED | OUTPATIENT
Start: 2022-03-14 | End: 2022-03-14

## 2022-03-14 RX ADMIN — AMINOPHYLLINE 100 MG: 25 INJECTION, SOLUTION INTRAVENOUS at 10:23

## 2022-03-14 RX ADMIN — REGADENOSON 0.4 MG: 0.08 INJECTION, SOLUTION INTRAVENOUS at 10:19

## 2022-03-14 RX ADMIN — TETROFOSMIN 10 MILLICURIE: 1.38 INJECTION, POWDER, LYOPHILIZED, FOR SOLUTION INTRAVENOUS at 09:12

## 2022-03-14 RX ADMIN — TETROFOSMIN 30 MILLICURIE: 1.38 INJECTION, POWDER, LYOPHILIZED, FOR SOLUTION INTRAVENOUS at 10:25

## 2022-03-14 NOTE — PROGRESS NOTES
Instructed on Lexiscan Stress Test Procedure including possible side effects/ adverse reactions. Patient verbalizes  understanding and denies having any questions . See [address] [place] [address] Cardiology

## 2022-05-23 ENCOUNTER — HOSPITAL ENCOUNTER (OUTPATIENT)
Dept: MAMMOGRAPHY | Age: 72
Discharge: HOME | End: 2022-05-23
Payer: MEDICARE

## 2022-05-23 VITALS — WEIGHT: 260 LBS | HEIGHT: 59 IN | BODY MASS INDEX: 52.41 KG/M2

## 2022-05-23 DIAGNOSIS — Z12.31 VISIT FOR SCREENING MAMMOGRAM: ICD-10-CM

## 2022-05-23 PROCEDURE — 77067 SCR MAMMO BI INCL CAD: CPT

## 2023-09-22 ENCOUNTER — HOSPITAL ENCOUNTER (OUTPATIENT)
Dept: MAMMOGRAPHY | Age: 73
Discharge: HOME | End: 2023-09-22

## 2023-09-22 VITALS — HEIGHT: 59 IN | WEIGHT: 264 LBS | BODY MASS INDEX: 53.22 KG/M2

## 2023-09-22 DIAGNOSIS — Z12.31 VISIT FOR SCREENING MAMMOGRAM: ICD-10-CM

## 2023-09-22 PROCEDURE — 77063 BREAST TOMOSYNTHESIS BI: CPT

## 2024-09-23 ENCOUNTER — HOSPITAL ENCOUNTER (OUTPATIENT)
Dept: MAMMOGRAPHY | Age: 74
Discharge: HOME | End: 2024-09-28

## 2024-09-23 DIAGNOSIS — Z12.31 VISIT FOR SCREENING MAMMOGRAM: ICD-10-CM
